# Patient Record
Sex: MALE | Race: OTHER
[De-identification: names, ages, dates, MRNs, and addresses within clinical notes are randomized per-mention and may not be internally consistent; named-entity substitution may affect disease eponyms.]

---

## 2022-07-25 PROBLEM — Z00.00 ENCOUNTER FOR PREVENTIVE HEALTH EXAMINATION: Status: ACTIVE | Noted: 2022-07-25

## 2022-08-04 ENCOUNTER — APPOINTMENT (OUTPATIENT)
Dept: NEUROLOGY | Facility: CLINIC | Age: 52
End: 2022-08-04

## 2022-08-04 VITALS
BODY MASS INDEX: 33.27 KG/M2 | HEART RATE: 79 BPM | HEIGHT: 67 IN | WEIGHT: 212 LBS | SYSTOLIC BLOOD PRESSURE: 153 MMHG | DIASTOLIC BLOOD PRESSURE: 87 MMHG

## 2022-08-04 DIAGNOSIS — G56.03 CARPAL TUNNEL SYNDROM,BILATERAL UPPER LIMBS: ICD-10-CM

## 2022-08-04 PROCEDURE — 99213 OFFICE O/P EST LOW 20 MIN: CPT

## 2022-08-04 NOTE — ASSESSMENT
[FreeTextEntry1] :  bilateral carpal tunnel  syndrome- EMG done, no further testing necessary.  Occupational therapy did not help him therefore I recommend trying on acupuncture or follow-up with our Hand surgery for either steroid injections or carpal tunnel release surgery.  He can return to Neurology on p.r.n. basis

## 2022-08-04 NOTE — HISTORY OF PRESENT ILLNESS
[FreeTextEntry1] : Mr. Crecsencio Valle  returns to the office for follow-up and his prior history and physical have been reviewed and he reports no change since last visit.  He was last seen for the evaluation tunnel syndrome which was chronic.  He was sent for EMG nerve conduction study of the upper extremities which showed bilateral carpal tunnel syndrome worse right than left.  He was sent for occupational therapy which  he claimed that not only did it not help, it actually made his symptom worse.  He would like to try acupuncture but has not had a chance to try it yet.  He would consider steroid injections or even surgery if needed because the symptom really is bothering him a lot.

## 2025-02-07 ENCOUNTER — APPOINTMENT (OUTPATIENT)
Dept: OTOLARYNGOLOGY | Facility: CLINIC | Age: 55
End: 2025-02-07
Payer: MEDICAID

## 2025-02-07 VITALS — BODY MASS INDEX: 34.99 KG/M2 | WEIGHT: 210 LBS | HEIGHT: 65 IN

## 2025-02-07 DIAGNOSIS — H91.93 UNSPECIFIED HEARING LOSS, BILATERAL: ICD-10-CM

## 2025-02-07 DIAGNOSIS — R07.0 PAIN IN THROAT: ICD-10-CM

## 2025-02-07 PROCEDURE — 92567 TYMPANOMETRY: CPT

## 2025-02-07 PROCEDURE — 99203 OFFICE O/P NEW LOW 30 MIN: CPT | Mod: 25

## 2025-02-07 PROCEDURE — 31575 DIAGNOSTIC LARYNGOSCOPY: CPT

## 2025-02-07 PROCEDURE — 92557 COMPREHENSIVE HEARING TEST: CPT

## 2025-07-02 ENCOUNTER — APPOINTMENT (OUTPATIENT)
Dept: GASTROENTEROLOGY | Facility: CLINIC | Age: 55
End: 2025-07-02

## 2025-07-02 ENCOUNTER — NON-APPOINTMENT (OUTPATIENT)
Age: 55
End: 2025-07-02

## 2025-07-02 VITALS
SYSTOLIC BLOOD PRESSURE: 129 MMHG | DIASTOLIC BLOOD PRESSURE: 85 MMHG | HEIGHT: 67 IN | WEIGHT: 201.6 LBS | BODY MASS INDEX: 31.64 KG/M2 | HEART RATE: 64 BPM

## 2025-07-02 PROBLEM — Z78.9 CAFFEINE USE: Status: ACTIVE | Noted: 2025-07-02

## 2025-07-02 PROBLEM — K56.1 INTUSSUSCEPTION OF JEJUNUM: Status: ACTIVE | Noted: 2025-07-02

## 2025-07-02 PROBLEM — Z78.9 NON-SMOKER: Status: ACTIVE | Noted: 2025-07-02

## 2025-07-02 PROBLEM — R10.10 PAIN OF UPPER ABDOMEN: Status: ACTIVE | Noted: 2025-07-02

## 2025-07-02 PROBLEM — Z78.9 SOCIAL ALCOHOL USE: Status: ACTIVE | Noted: 2025-07-02

## 2025-07-02 PROBLEM — R10.13 EPIGASTRIC ABDOMINAL PAIN OF UNKNOWN ETIOLOGY: Status: ACTIVE | Noted: 2025-07-02

## 2025-07-02 PROBLEM — Z86.79 HISTORY OF HYPERTENSION: Status: RESOLVED | Noted: 2025-07-02 | Resolved: 2025-07-02

## 2025-07-02 PROCEDURE — 99203 OFFICE O/P NEW LOW 30 MIN: CPT

## 2025-07-02 RX ORDER — ATORVASTATIN CALCIUM 20 MG/1
20 TABLET, FILM COATED ORAL
Refills: 0 | Status: ACTIVE | COMMUNITY

## 2025-07-02 RX ORDER — LISINOPRIL 10 MG/1
10 TABLET ORAL
Refills: 0 | Status: ACTIVE | COMMUNITY

## 2025-08-02 ENCOUNTER — RESULT REVIEW (OUTPATIENT)
Age: 55
End: 2025-08-02

## 2025-08-02 ENCOUNTER — OUTPATIENT (OUTPATIENT)
Dept: OUTPATIENT SERVICES | Facility: HOSPITAL | Age: 55
LOS: 1 days | End: 2025-08-02
Payer: MEDICAID

## 2025-08-02 DIAGNOSIS — Z00.8 ENCOUNTER FOR OTHER GENERAL EXAMINATION: ICD-10-CM

## 2025-08-02 DIAGNOSIS — K56.1 INTUSSUSCEPTION: ICD-10-CM

## 2025-08-02 PROCEDURE — A9579: CPT

## 2025-08-02 PROCEDURE — 72197 MRI PELVIS W/O & W/DYE: CPT

## 2025-08-02 PROCEDURE — 72197 MRI PELVIS W/O & W/DYE: CPT | Mod: 26

## 2025-08-02 PROCEDURE — 74183 MRI ABD W/O CNTR FLWD CNTR: CPT

## 2025-08-02 PROCEDURE — 74183 MRI ABD W/O CNTR FLWD CNTR: CPT | Mod: 26

## 2025-08-03 DIAGNOSIS — K56.1 INTUSSUSCEPTION: ICD-10-CM

## 2025-08-25 ENCOUNTER — NON-APPOINTMENT (OUTPATIENT)
Age: 55
End: 2025-08-25